# Patient Record
Sex: FEMALE | Race: WHITE | NOT HISPANIC OR LATINO | URBAN - METROPOLITAN AREA
[De-identification: names, ages, dates, MRNs, and addresses within clinical notes are randomized per-mention and may not be internally consistent; named-entity substitution may affect disease eponyms.]

---

## 2023-01-08 ENCOUNTER — OFFICE VISIT (OUTPATIENT)
Dept: URGENT CARE | Facility: CLINIC | Age: 43
End: 2023-01-08

## 2023-01-08 VITALS
DIASTOLIC BLOOD PRESSURE: 72 MMHG | HEIGHT: 71 IN | HEART RATE: 69 BPM | WEIGHT: 293 LBS | OXYGEN SATURATION: 99 % | RESPIRATION RATE: 16 BRPM | BODY MASS INDEX: 41.02 KG/M2 | SYSTOLIC BLOOD PRESSURE: 146 MMHG | TEMPERATURE: 97 F

## 2023-01-08 DIAGNOSIS — B96.89 BACTERIAL CONJUNCTIVITIS OF BOTH EYES: Primary | ICD-10-CM

## 2023-01-08 DIAGNOSIS — H10.9 BACTERIAL CONJUNCTIVITIS OF BOTH EYES: Primary | ICD-10-CM

## 2023-01-08 RX ORDER — POLYMYXIN B SULFATE AND TRIMETHOPRIM 1; 10000 MG/ML; [USP'U]/ML
1 SOLUTION OPHTHALMIC EVERY 4 HOURS
Qty: 10 ML | Refills: 0 | Status: SHIPPED | OUTPATIENT
Start: 2023-01-08 | End: 2023-01-08

## 2023-01-08 RX ORDER — OFLOXACIN 3 MG/ML
1 SOLUTION/ DROPS OPHTHALMIC 4 TIMES DAILY
Qty: 5 ML | Refills: 0 | Status: SHIPPED | OUTPATIENT
Start: 2023-01-08 | End: 2023-01-15

## 2023-01-08 NOTE — PATIENT INSTRUCTIONS
Wash your hands frequently    Use the drops as directed   Do not wear your contacts until you finish the drops

## 2023-01-08 NOTE — PROGRESS NOTES
[facility]        NAME: Gina Johnson is a 36 y o  female  : 1980    MRN: [id number]  DATE: 2023  TIME: 10:14 AM    Assessment and Plan   Bacterial conjunctivitis of both eyes [H10 9, B96 89]  1  Bacterial conjunctivitis of both eyes  ofloxacin (OCUFLOX) 0 3 % ophthalmic solution    DISCONTINUED: polymyxin b-trimethoprim (POLYTRIM) ophthalmic solution            Patient Instructions     Wash your hands frequently  Use the drops as directed   Do not wear your contacts until you finish the drops  Follow up with PCP in 3-5 days  Proceed to  ER if symptoms worsen  Chief Complaint     Chief Complaint   Patient presents with   • Eye Problem     Left eye red, itchy, and painful with discharge starting yesterday; right eye itchy starting this AM         History of Present Illness       Eye Problem   The left eye is affected  This is a new problem  The current episode started in the past 7 days  The problem occurs constantly  The problem has been gradually worsening  There was no injury mechanism  The pain is mild  There is no known exposure to pink eye  She wears contacts  Associated symptoms include an eye discharge, eye redness, itching and a recent URI  Pertinent negatives include no fever, foreign body sensation or photophobia  Review of Systems   Review of Systems   Constitutional: Negative for activity change, appetite change and fever  HENT: Negative for congestion  Eyes: Positive for pain, discharge, redness and itching  Negative for photophobia  Respiratory: Negative for cough and shortness of breath  Cardiovascular: Negative for chest pain  All other systems reviewed and are negative          Current Medications       Current Outpatient Medications:   •  ofloxacin (OCUFLOX) 0 3 % ophthalmic solution, Administer 1 drop to both eyes 4 (four) times a day for 7 days, Disp: 5 mL, Rfl: 0    Current Allergies     Allergies as of 2023   • (No Known Allergies) The following portions of the patient's history were reviewed and updated as appropriate: allergies, current medications, past family history, past medical history, past social history, past surgical history and problem list      Past Medical History:   Diagnosis Date   • Kidney stone        Past Surgical History:   Procedure Laterality Date   •  SECTION     • CHOLECYSTECTOMY         Family History   Problem Relation Age of Onset   • Hyperlipidemia Mother    • Hypertension Mother    • Hyperlipidemia Father    • Hypertension Father    • Cancer Father          Medications have been verified  Objective   /72   Pulse 69   Temp (!) 97 °F (36 1 °C)   Resp 16   Ht 5' 11" (1 803 m)   Wt 136 kg (300 lb)   LMP 2023   SpO2 99%   BMI 41 84 kg/m²        Physical Exam     Physical Exam  Vitals and nursing note reviewed  Constitutional:       General: She is not in acute distress  Appearance: Normal appearance  She is normal weight  She is not ill-appearing or toxic-appearing  HENT:      Mouth/Throat:      Pharynx: Oropharynx is clear  Eyes:      General: Lids are normal  Vision grossly intact  Left eye: Discharge present  No foreign body or hordeolum  Conjunctiva/sclera:      Left eye: Left conjunctiva is injected  Cardiovascular:      Rate and Rhythm: Normal rate  Pulmonary:      Effort: Pulmonary effort is normal    Neurological:      Mental Status: She is alert